# Patient Record
Sex: FEMALE | Race: BLACK OR AFRICAN AMERICAN | URBAN - METROPOLITAN AREA
[De-identification: names, ages, dates, MRNs, and addresses within clinical notes are randomized per-mention and may not be internally consistent; named-entity substitution may affect disease eponyms.]

---
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
## 2021-05-02 NOTE — NURSING NOTE
"Chief Complaint   Patient presents with     Head Injury     Pt c/o head injury at school today.        Initial /74 (BP Location: Left arm, Patient Position: Chair, Cuff Size: Adult Small)  Pulse 75  Temp 98.1  F (36.7  C) (Tympanic)  Wt 100 lb (45.4 kg)  SpO2 100% Estimated body mass index is 21.36 kg/(m^2) as calculated from the following:    Height as of 4/28/14: 4' 8.38\" (1.432 m).    Weight as of 4/28/14: 96 lb 9 oz (43.8 kg).  Medication Reconciliation: complete     Daniela Mckenzie CMA (AAMA)      "
stated